# Patient Record
Sex: FEMALE | Race: AMERICAN INDIAN OR ALASKA NATIVE
[De-identification: names, ages, dates, MRNs, and addresses within clinical notes are randomized per-mention and may not be internally consistent; named-entity substitution may affect disease eponyms.]

---

## 2018-08-22 ENCOUNTER — HOSPITAL ENCOUNTER (EMERGENCY)
Dept: HOSPITAL 5 - ED | Age: 28
Discharge: LEFT BEFORE DISCHARGE | End: 2018-08-22
Payer: SELF-PAY

## 2018-08-22 VITALS — DIASTOLIC BLOOD PRESSURE: 95 MMHG | SYSTOLIC BLOOD PRESSURE: 153 MMHG

## 2018-08-22 DIAGNOSIS — R10.9: Primary | ICD-10-CM

## 2018-08-22 DIAGNOSIS — Z53.21: ICD-10-CM

## 2018-08-22 LAB
BACTERIA #/AREA URNS HPF: (no result) /HPF
BILIRUB UR QL STRIP: (no result)
BLOOD UR QL VISUAL: (no result)
MUCOUS THREADS #/AREA URNS HPF: (no result) /HPF
PH UR STRIP: 7 [PH] (ref 5–7)
PROT UR STRIP-MCNC: (no result) MG/DL
RBC #/AREA URNS HPF: 1 /HPF (ref 0–6)
UROBILINOGEN UR-MCNC: < 2 MG/DL (ref ?–2)
WBC #/AREA URNS HPF: 1 /HPF (ref 0–6)

## 2018-08-22 PROCEDURE — 81025 URINE PREGNANCY TEST: CPT

## 2018-08-22 PROCEDURE — 81001 URINALYSIS AUTO W/SCOPE: CPT

## 2018-08-27 ENCOUNTER — HOSPITAL ENCOUNTER (EMERGENCY)
Dept: HOSPITAL 5 - ED | Age: 28
Discharge: HOME | End: 2018-08-27
Payer: SELF-PAY

## 2018-08-27 VITALS — SYSTOLIC BLOOD PRESSURE: 131 MMHG | DIASTOLIC BLOOD PRESSURE: 90 MMHG

## 2018-08-27 DIAGNOSIS — R35.0: ICD-10-CM

## 2018-08-27 DIAGNOSIS — R39.15: ICD-10-CM

## 2018-08-27 DIAGNOSIS — R10.32: Primary | ICD-10-CM

## 2018-08-27 DIAGNOSIS — N89.8: ICD-10-CM

## 2018-08-27 LAB
BILIRUB UR QL STRIP: (no result)
BLOOD UR QL VISUAL: (no result)
MUCOUS THREADS #/AREA URNS HPF: (no result) /HPF
PH UR STRIP: 5 [PH] (ref 5–7)
PROT UR STRIP-MCNC: (no result) MG/DL
RBC #/AREA URNS HPF: 3 /HPF (ref 0–6)
UROBILINOGEN UR-MCNC: < 2 MG/DL (ref ?–2)
WBC #/AREA URNS HPF: 4 /HPF (ref 0–6)

## 2018-08-27 PROCEDURE — 81001 URINALYSIS AUTO W/SCOPE: CPT

## 2018-08-27 PROCEDURE — 81025 URINE PREGNANCY TEST: CPT

## 2018-08-27 PROCEDURE — 76700 US EXAM ABDOM COMPLETE: CPT

## 2018-08-27 NOTE — EMERGENCY DEPARTMENT REPORT
ED Abdominal Pain HPI





- General


Chief Complaint: Abdominal Pain


Stated Complaint: ABD PAIN/DISCHARGE


Time Seen by Provider: 18 13:59


Source: patient


Mode of arrival: Ambulatory


Limitations: No Limitations





- History of Present Illness


Initial Comments: 





This is a 28-year-old female that presents with abdominal pain for one month.  

Patient states pain is generalized and she is also having some vaginal 

discharge.  Patient states she was seen.  My fear with similar symptoms a few 

weeks ago and treated for STDs with shot of antibiotics or medication and 

discharged home with Flagyl.  She states she completed all medication and 

continues to have some discharge without an odor.  Patient does admits to 

frequency and urgency without dysuria.  Last menstrual period is 2018,  A1.  Patient denies nausea or vomiting, vaginal bleeding, chest pain fever.


MD Complaint: abdominal pain


Onset/Timin


-: month(s)


Location: diffuse


Radiation: none


Migration to: no migration


Severity: moderate


Severity scale (0 -10): 6


Quality: cramping


Consistency: intermittent


Improves With: nothing


Worsens With: nothing


Associated Symptoms: denies other symptoms


Treatments Prior to Arrival: NSAIDs





- Related Data


LMP Date: 18


 Previous Rx's











 Medication  Instructions  Recorded  Last Taken  Type


 


Amoxicillin [Trimox CAP] 500 mg PO Q8H #30 capsule 16 Unknown Rx


 


Cetirizine HCl [ZyrTEC] 10 mg PO DAILY #20 capsule 16 Unknown Rx


 


predniSONE [Deltasone] 20 mg PO QDAY #5 tab 16 Unknown Rx











 Allergies











Allergy/AdvReac Type Severity Reaction Status Date / Time


 


No Known Allergies Allergy   Verified 16 22:24














ED Review of Systems


ROS: 


Stated complaint: ABD PAIN/DISCHARGE


Other details as noted in HPI





Constitutional: denies: chills, fever


Respiratory: denies: cough, shortness of breath, wheezing


Cardiovascular: denies: chest pain, palpitations


Gastrointestinal: abdominal pain (generalized abdominal pain).  denies: nausea, 

diarrhea


Genitourinary: discharge.  denies: urgency, dysuria


Musculoskeletal: denies: back pain, joint swelling, arthralgia


Skin: denies: rash, lesions


Neurological: denies: headache, weakness, paresthesias


Psychiatric: denies: anxiety, depression





ED Past Medical Hx





- Past Medical History


Previous Medical History?: No





- Surgical History


Past Surgical History?: Yes


Additional Surgical History: abd cyst removal and c section





- Social History


Smoking Status: Never Smoker


Substance Use Type: None





- Medications


Home Medications: 


 Home Medications











 Medication  Instructions  Recorded  Confirmed  Last Taken  Type


 


Amoxicillin [Trimox CAP] 500 mg PO Q8H #30 capsule 16  Unknown Rx


 


Cetirizine HCl [ZyrTEC] 10 mg PO DAILY #20 capsule 16  Unknown Rx


 


predniSONE [Deltasone] 20 mg PO QDAY #5 tab 16  Unknown Rx














ED Physical Exam





- General


Limitations: No Limitations


General appearance: alert, in no apparent distress, obese





- Respiratory


Respiratory exam: Present: normal lung sounds bilaterally.  Absent: respiratory 

distress





- Cardiovascular


Cardiovascular Exam: Present: regular rate, normal rhythm.  Absent: systolic 

murmur, diastolic murmur, rubs, gallop





- GI/Abdominal


GI/Abdominal exam: Present: soft, tenderness (left lower quadrant tenderness), 

normal bowel sounds.  Absent: distended, guarding, rebound, rigid, organomegaly

, mass





- Neurological Exam


Neurological exam: Present: alert, oriented X3





- Psychiatric


Psychiatric exam: Present: normal affect, normal mood





- Skin


Skin exam: Present: warm, dry, intact, normal color.  Absent: rash





ED Course


 Vital Signs











  18





  12:41 18:27


 


Temperature 99.1 F 


 


Pulse Rate 89 86


 


Respiratory 18 16





Rate  


 


Blood Pressure 134/94 


 


Blood Pressure  131/90





[Left]  


 


O2 Sat by Pulse 100 100





Oximetry  














ED Medical Decision Making





- Radiology Data


Radiology results: report reviewed, image reviewed





EXAM: US ABDOMEN COMPLETE 





HISTORY: left lower quadrant tenderness 





TECHNIQUE: Standard ultrasound of the abdomen 





PRIORS: None. 





FINDINGS: 


Examination of the gallbladder demonstrates an echogenic non 


mobile non shadowing 6 mm focus in the gallbladder. This may 


represent an adherent gallstone or small polyp. Otherwise, there 


is no evidence for distention, wall thickening, or 


pericholecystic fluid. No sonographic Collins's sign is elicited. 


Common bile duct is normal in diameter measuring 2.1 mm. 





The liver is normal and homogeneous in echogenicity without focal 


abnormality or intrahepatic biliary dilatation. The pancreas is 


normal in thickness without focal abnormality or pancreatic duct 


dilatation. The spleen is normal in length measuring 9.2 cm and 


homogeneous in echogenicity without focal abnormalities. 





Examination of the kidneys demonstrates both to be normal in size 


and have normal cortical echogenicity and thickness. The right 


and left kidneys measure 10.6 cm and 10.6 cm in craniocaudal 


length, respectively. No evidence for calculi, hydronephrosis, or 


solid mass is seen in either kidney. 





The inferior vena cava and aorta are normal. Maximum diameter of 


the aorta is 2.0 cm in its proximal portion. 





Examination in the left lower quadrant in the area of patient's 


pain shows no underlying abnormality 





IMPRESSION: 


1. Echogenic non mobile non shadowing focus in the gallbladder 


which may represent an adherent gallstone or small polyp 





2. Otherwise, negative exam. No abnormality in the left lower 


quadrant in the area of the patient's pain. 








- Medical Decision Making





Patient examined by myself in fast track.  


Vitals are normal and patient is in no acute distress.  


Obtained labs and ultrasound of abdomen.


Patient informed of results.  


Incidental findings of possible small gallstone.


Plan discussed with patient to discharge home and treat outpatient. 


Patient discharged home in stable condition. 


Follow up with PCP at [facility] 2-3 days.


Critical care attestation.: 


If time is entered above; I have spent that time in minutes in the direct care 

of this critically ill patient, excluding procedure time.








ED Disposition


Clinical Impression: 


 Vaginal discharge, Urinary tract infection symptoms





Abdominal pain


Qualifiers:


 Abdominal location: left lower quadrant Qualified Code(s): R10.32 - Left lower 

quadrant pain





Disposition:  TO HOME OR SELFCARE


Is pt being admited?: No


Does the pt Need Aspirin: No


Condition: Stable


Instructions:  Abdominal Pain (ED)


Additional Instructions: 


Follow up with primary care provider in 1 week for further evaluation if 

symptoms are not improving.


Referrals: 


[facility] [Outside] - 3-5 Days


[facility] [Outside] - 3-5 Days


Forms:  Work/School Release Form(ED)


Time of Disposition: 18:18


Print Language: ENGLISH

## 2018-08-27 NOTE — ULTRASOUND REPORT
FINAL REPORT



EXAM:  US ABDOMEN COMPLETE



HISTORY:  left lower quadrant tenderness 



TECHNIQUE:  Standard ultrasound of the abdomen 



PRIORS:  None.



FINDINGS:  

Examination of the gallbladder demonstrates an echogenic non

mobile non shadowing 6 mm focus in the gallbladder. This may

represent an adherent gallstone or small polyp. Otherwise, there

is no evidence for distention, wall thickening, or

pericholecystic fluid. No sonographic Collins's sign is elicited.

Common bile duct is normal in diameter measuring 2.1 mm.



The liver is normal and homogeneous in echogenicity without focal

abnormality or intrahepatic biliary dilatation. The pancreas is

normal in thickness without focal abnormality or pancreatic duct

dilatation.  The spleen is normal in length measuring 9.2 cm and

homogeneous in echogenicity without focal abnormalities.



Examination of the kidneys demonstrates both to be normal in size

and have normal cortical echogenicity and thickness.  The right

and left kidneys measure 10.6 cm and 10.6 cm in craniocaudal

length, respectively. No evidence for calculi, hydronephrosis, or

solid mass is seen in either kidney.



The inferior vena cava and aorta are normal. Maximum diameter of

the aorta is 2.0  cm in its proximal portion. 



Examination in the left lower quadrant in the area of patient's

pain shows no underlying abnormality 



IMPRESSION:  

1. Echogenic non mobile non shadowing focus in the gallbladder

which may represent an adherent gallstone or small polyp 



2. Otherwise, negative exam. No abnormality in the left lower

quadrant in the area of the patient's pain.

## 2019-11-20 ENCOUNTER — HOSPITAL ENCOUNTER (EMERGENCY)
Dept: HOSPITAL 5 - ED | Age: 29
Discharge: LEFT BEFORE DISCHARGE | End: 2019-11-20
Payer: SELF-PAY

## 2019-11-20 VITALS — SYSTOLIC BLOOD PRESSURE: 121 MMHG | DIASTOLIC BLOOD PRESSURE: 79 MMHG

## 2019-11-20 DIAGNOSIS — B36.9: Primary | ICD-10-CM

## 2019-11-20 DIAGNOSIS — Z79.899: ICD-10-CM

## 2019-11-20 NOTE — EMERGENCY DEPARTMENT REPORT
ED Rash HPI





- HPI


Chief Complaint: Skin Rash


Stated Complaint: ALLERGIC REACTION


Time Seen by Provider: 11/20/19 10:15


Duration: 1.5 weeks


Location: Other (to the chest in between and underneath her breast some rash to 

the axillary region.  Mild pruritus noted no known irritants)


Rash Symptoms: Yes Itching, No Facial Swelling, No Tongue/Oral Swelling, No 

Breathing Difficulties, No Choking Sensation, No Wheezing/Dyspnea, No Peeling, 

No Blistering, No Fever, No Lightheaded


Severity: mild


Other History: 29-year-old American female since emergency department 

complaining of a mild pruritic rash to her arms and her breast region is begin 

to develop a mild odor.  No pain involved.  No fever, chills, sweats no chest 

pain no joint pain or shortness of breath no coryza is associated.  She tried no

medications over-the-counter N should the ED for further evaluation and 

treatment options





ED Review of Systems


ROS: 


Stated complaint: ALLERGIC REACTION


Other details as noted in HPI





Comment: All other systems reviewed and negative





ED Past Medical Hx





- Past Medical History


Previous Medical History?: No





- Surgical History


Past Surgical History?: Yes


Additional Surgical History: C section





- Social History


Smoking Status: Never Smoker


Substance Use Type: None





- Medications


Home Medications: 


                                Home Medications











 Medication  Instructions  Recorded  Confirmed  Last Taken  Type


 


Amoxicillin [Trimox CAP] 500 mg PO Q8H #30 capsule 09/29/16  Unknown Rx


 


Cetirizine HCl [ZyrTEC] 10 mg PO DAILY #20 capsule 09/29/16  Unknown Rx


 


predniSONE [Deltasone] 20 mg PO QDAY #5 tab 09/29/16  Unknown Rx


 


Nystatin Cream [Mycostatin Cream] 1 applic TP BID #60 tube 11/20/19  Unknown Rx














Rash Exam





- Exam


General: 


Vital signs noted. No distress. Alert and acting appropriately.





HEENT: No Periorbital Edema, No Conjuctival Injection, No Chemosis, No Perioral 

Edema, No Tongue Edema, No Uvular Edema, No Compromised Airway, No Drooling


Lungs: Yes Good Air Exchange (Normal Breath Sounds), No Wheezes, No Ronchi, No 

Stridor, No Cough, No Labored Respirations, No Retractions, No Use of Accessory 

Muscles, No Other Abnormal Lung Sounds


Heart: Yes Regular, No Murmur


Front/Back of Body, Lg (Color): 


                            __________________________














                            __________________________





 1 - Rash to this region





 2 - Rash to this region





 3 - Rash to the region





Skin: Yes Other (hyperpigmented take rash between the chest and underneath the 

breast indicative of a tinea.  Minimal excoriation is noted.  No cellulitis no 

lymphangitis no pustules.  No lymphadenopathy)


Other: Positive: Abdomen Normal, Neurologic Normal, Musculoskeletal Normal





ED Course


                                   Vital Signs











  11/20/19





  09:39


 


Temperature 98.3 F


 


Pulse Rate 84


 


Respiratory 16





Rate 


 


Blood Pressure 121/79


 


O2 Sat by Pulse 98





Oximetry 














ED Medical Decision Making





- Medical Decision Making





29-year-old -American female since emergency Department with a 

nonemergent rash to the chest suggestive of a tinea please rash.  No allergic 

reaction no cellulitis no infectious processes at present.  There is range of 

motion with no limitations DL knowledge about this is a new


Critical care attestation.: 


If time is entered above; I have spent that time in minutes in the direct care 

of this critically ill patient, excluding procedure time.








ED Disposition


Clinical Impression: 


 Fungal rash of torso





Disposition: Z-07 MED SCREENING EXAM-LEFT


Is pt being admited?: No


Does the pt Need Aspirin: No


Condition: Stable


Instructions:  Acute Rash (ED)


Prescriptions: 


Nystatin Cream [Mycostatin Cream] 1 applic TP BID #60 tube


Referrals: 


[facility] [Provider Group] - 3-5 Days

## 2020-11-24 ENCOUNTER — HOSPITAL ENCOUNTER (OUTPATIENT)
Dept: HOSPITAL 5 - MAMMO | Age: 30
Discharge: HOME | End: 2020-11-24
Attending: SPECIALIST
Payer: COMMERCIAL

## 2020-11-24 DIAGNOSIS — N63.20: ICD-10-CM

## 2020-11-24 DIAGNOSIS — N60.02: Primary | ICD-10-CM

## 2020-11-24 PROCEDURE — 77066 DX MAMMO INCL CAD BI: CPT

## 2020-11-24 NOTE — MAMMOGRAPHY REPORT
DIGITAL DIAGNOSTIC MAMMOGRAM WITH CAD CONVENTIONAL, 11/24/2020



CLINICAL INFORMATION / INDICATION: LT BREAST LUMP



TECHNIQUE:  Digital bilateral mammographic imaging was performed.

This examination was interpreted with the benefit of Computer-aided Detection analysis. 



COMPARISON: None available.



FINDINGS: 



Breast Density: There are scattered areas of fibroglandular density.



No dominant mass, suspicious calcifications or architectural distortion in either breast. 







IMPRESSION: No mammographic evidence of malignancy. Left breast ultrasound focusing on the area of cl
inical interest is recommended for complete evaluation.



Follow up recommendation: Ultrasound



BI-RADS Category 0: Incomplete. Needs additional imaging evaluation and/or prior mammograms for ro johnson.



-------------------------------------------------------------------------------------------

A "normal" or negative report should not discourage follow up or biopsy of a clinically significant f
inding.



A written summary of these findings will be mailed to the patient. The patient will be entered into a
 mammography reporting system which will generate a reminder letter for the patient's next appointmen
t at the appropriate interval.



According to the [employer], yearly mammograms are recommended starting at age 40 
and continuing as long as a woman is in good health.  Breast MRI is recommended for women with an guille
roximately 20-25% or greater lifetime risk of breast cancer, including women with a strong family his
tory of breast or ovarian cancer and women who have been treated for Hodgkin's disease.



Signer Name: James Drew MD 

Signed: 11/24/2020 4:46 PM

Workstation Name: [email or web address]

## 2020-11-25 NOTE — ULTRASOUND REPORT
ULTRASOUND BREAST LEFT LIMITED, 11/24/2020



CLINICAL INFORMATION / INDICATION: LUMP IN LEFT BREAST.



TECHNIQUE: Targeted ultrasound evaluation was performed of the area of interest.  



COMPARISON: Bilateral mammogram today



FINDINGS: 

Area of reported concern is roughly in the upper outer quadrant. In the 12:00 position a small simple
 cyst is seen measuring 6 mm. In the 4:00 position centrally a 4 mm simple cyst is noted. No signific
ant lesions are seen.





IMPRESSION: No sonographic evidence of malignancy.



Follow up recommendation: Clinical follow-up



BI-RADS Category 2:  Benign.





-------------------------------------------------------------------------------------------

A normal or "negative" report should not preclude biopsy or follow-up of a clinically suspicious find
ing.



Signer Name: Brendan Ingram MD 

Signed: 11/25/2020 7:35 AM

Workstation Name: [id number]